# Patient Record
Sex: FEMALE | Race: WHITE | NOT HISPANIC OR LATINO | Employment: OTHER | URBAN - METROPOLITAN AREA
[De-identification: names, ages, dates, MRNs, and addresses within clinical notes are randomized per-mention and may not be internally consistent; named-entity substitution may affect disease eponyms.]

---

## 2021-02-02 ENCOUNTER — HOSPITAL ENCOUNTER (OUTPATIENT)
Facility: MEDICAL CENTER | Age: 66
End: 2021-02-03
Attending: STUDENT IN AN ORGANIZED HEALTH CARE EDUCATION/TRAINING PROGRAM | Admitting: INTERNAL MEDICINE
Payer: MEDICARE

## 2021-02-02 ENCOUNTER — APPOINTMENT (OUTPATIENT)
Dept: RADIOLOGY | Facility: MEDICAL CENTER | Age: 66
End: 2021-02-02
Payer: MEDICARE

## 2021-02-02 PROBLEM — R07.9 CHEST PAIN: Status: ACTIVE | Noted: 2021-02-02

## 2021-02-02 LAB
EKG IMPRESSION: NORMAL
EKG IMPRESSION: NORMAL
TROPONIN T SERPL-MCNC: 7 NG/L (ref 6–19)
TROPONIN T SERPL-MCNC: 7 NG/L (ref 6–19)

## 2021-02-02 PROCEDURE — 99220 PR INITIAL OBSERVATION CARE,LEVL III: CPT | Performed by: INTERNAL MEDICINE

## 2021-02-02 PROCEDURE — 36415 COLL VENOUS BLD VENIPUNCTURE: CPT

## 2021-02-02 PROCEDURE — 93010 ELECTROCARDIOGRAM REPORT: CPT | Performed by: INTERNAL MEDICINE

## 2021-02-02 PROCEDURE — 93005 ELECTROCARDIOGRAM TRACING: CPT | Performed by: INTERNAL MEDICINE

## 2021-02-02 PROCEDURE — 84484 ASSAY OF TROPONIN QUANT: CPT

## 2021-02-02 PROCEDURE — G0378 HOSPITAL OBSERVATION PER HR: HCPCS

## 2021-02-02 RX ORDER — POLYETHYLENE GLYCOL 3350 17 G/17G
1 POWDER, FOR SOLUTION ORAL
Status: DISCONTINUED | OUTPATIENT
Start: 2021-02-02 | End: 2021-02-03 | Stop reason: HOSPADM

## 2021-02-02 RX ORDER — AMOXICILLIN 250 MG
2 CAPSULE ORAL 2 TIMES DAILY
Status: DISCONTINUED | OUTPATIENT
Start: 2021-02-02 | End: 2021-02-03 | Stop reason: HOSPADM

## 2021-02-02 RX ORDER — ASPIRIN 81 MG/1
324 TABLET, CHEWABLE ORAL DAILY
Status: DISCONTINUED | OUTPATIENT
Start: 2021-02-02 | End: 2021-02-03 | Stop reason: HOSPADM

## 2021-02-02 RX ORDER — LOSARTAN POTASSIUM 50 MG/1
100 TABLET ORAL DAILY
COMMUNITY

## 2021-02-02 RX ORDER — ASPIRIN 300 MG/1
300 SUPPOSITORY RECTAL DAILY
Status: DISCONTINUED | OUTPATIENT
Start: 2021-02-02 | End: 2021-02-03 | Stop reason: HOSPADM

## 2021-02-02 RX ORDER — BISACODYL 10 MG
10 SUPPOSITORY, RECTAL RECTAL
Status: DISCONTINUED | OUTPATIENT
Start: 2021-02-02 | End: 2021-02-03 | Stop reason: HOSPADM

## 2021-02-02 RX ORDER — ASPIRIN 325 MG
325 TABLET ORAL DAILY
Status: DISCONTINUED | OUTPATIENT
Start: 2021-02-02 | End: 2021-02-03 | Stop reason: HOSPADM

## 2021-02-02 ASSESSMENT — COGNITIVE AND FUNCTIONAL STATUS - GENERAL
DAILY ACTIVITIY SCORE: 24
MOBILITY SCORE: 24
SUGGESTED CMS G CODE MODIFIER MOBILITY: CH
SUGGESTED CMS G CODE MODIFIER DAILY ACTIVITY: CH

## 2021-02-02 ASSESSMENT — PATIENT HEALTH QUESTIONNAIRE - PHQ9
1. LITTLE INTEREST OR PLEASURE IN DOING THINGS: NOT AT ALL
SUM OF ALL RESPONSES TO PHQ9 QUESTIONS 1 AND 2: 0
2. FEELING DOWN, DEPRESSED, IRRITABLE, OR HOPELESS: NOT AT ALL

## 2021-02-02 ASSESSMENT — ENCOUNTER SYMPTOMS
SEIZURES: 0
WEIGHT LOSS: 0
EYE PAIN: 0
COUGH: 0
PALPITATIONS: 0
EYE REDNESS: 0
DIARRHEA: 0
STRIDOR: 0
HEARTBURN: 0
DEPRESSION: 0
NAUSEA: 0
MYALGIAS: 0
FOCAL WEAKNESS: 0
NERVOUS/ANXIOUS: 0
HEADACHES: 0
INSOMNIA: 0
ABDOMINAL PAIN: 0
VOMITING: 0
EYE DISCHARGE: 0
SHORTNESS OF BREATH: 0
BLURRED VISION: 0
DIZZINESS: 0
NECK PAIN: 0
BACK PAIN: 0
FEVER: 0
SPUTUM PRODUCTION: 0
CHILLS: 0
ORTHOPNEA: 0

## 2021-02-02 ASSESSMENT — LIFESTYLE VARIABLES
EVER HAD A DRINK FIRST THING IN THE MORNING TO STEADY YOUR NERVES TO GET RID OF A HANGOVER: NO
DOES PATIENT WANT TO STOP DRINKING: NO
TOTAL SCORE: 0
HAVE PEOPLE ANNOYED YOU BY CRITICIZING YOUR DRINKING: NO
AVERAGE NUMBER OF DAYS PER WEEK YOU HAVE A DRINK CONTAINING ALCOHOL: 3
HAVE YOU EVER FELT YOU SHOULD CUT DOWN ON YOUR DRINKING: NO
ON A TYPICAL DAY WHEN YOU DRINK ALCOHOL HOW MANY DRINKS DO YOU HAVE: 1
EVER FELT BAD OR GUILTY ABOUT YOUR DRINKING: NO
TOTAL SCORE: 0
HOW MANY TIMES IN THE PAST YEAR HAVE YOU HAD 5 OR MORE DRINKS IN A DAY: 0
CONSUMPTION TOTAL: NEGATIVE
ALCOHOL_USE: YES
TOTAL SCORE: 0

## 2021-02-02 ASSESSMENT — PAIN DESCRIPTION - PAIN TYPE: TYPE: ACUTE PAIN

## 2021-02-02 NOTE — PROGRESS NOTES
[facility] TRIAGE OFFICER DIRECT ADMISSION REPORT  [place] [facility]  [place]ring physician: Dr Pierce  Transferring facility/physician contact number:   Chief complaint: chest pain  Pertinent history & patient course: 65 y F with chest pain, troponin negative x2, EKG with ST changes in lead V3, d dimer negative, HEART score 5  Pertinent imaging & lab results: as above  Code Status: FULL per transferring provider, I personally verified with the transferring provider patient's code status and the transferring provider has confirmed this with the patient.  Further work up or recommendations per triage officer prior to transfer: none  Consultants called prior to transfer and pertinent input from consultants: none/cardiology  Patient accepted for transfer: Yes  Consultants to be called upon arrival: none/cardiology  Admission status: Observation.   Floor requested: telemetry  If ICU transfer, name of intensivist case discussed with and pertinent input from critical care: n/a    Please inform the triage officer upon arrival of the patient to [facility] for assignment of a hospitalist to perform admission.     For any question or concerns regarding the care of this patient, please reach out to the assigned hospitalist.

## 2021-02-02 NOTE — H&P
Hospital Medicine History & Physical Note    Date of Service  2/2/2021    Primary Care Physician  No primary care provider on file.    Consultants  cardiology    Code Status  Full Code    Chief Complaint  Chest pain    History of Presenting Illness  65 y.o. female who presented 2/2/2021 with chest pain.  Patient initially presented to outlying facility for chest pain and initial work-up over the was negative.  Troponin was negative as well as D-dimer.  Patient was transferred here for cardiology evaluation.  According to the patient the chest pain started 24 hours ago around 5 6 PM yesterday afternoon.  She said that the pain is in back on her chest, throbbing pain, 10 out of 10 intensity, constant in nature.  Never had similar symptoms before.  Around 2 AM this morning she could not take it anymore so she went to outside facility for checkup.  And then she was then transferred here for further evaluation including stress test.  I reviewed chart from outside facility    Sodium 140, potassium 4.2, chloride 106, glucose 92, BUN 28, creatinine 0.9, troponin negative.  WBC 8.8, hemoglobin 14.3, platelet 355  CXR: negative  Dimer: normal    Review of Systems  Review of Systems   Constitutional: Negative for chills, fever and weight loss.   HENT: Negative for congestion and nosebleeds.    Eyes: Negative for blurred vision, pain, discharge and redness.   Respiratory: Negative for cough, sputum production, shortness of breath and stridor.    Cardiovascular: Positive for chest pain. Negative for palpitations and orthopnea.   Gastrointestinal: Negative for abdominal pain, diarrhea, heartburn, nausea and vomiting.   Genitourinary: Negative for dysuria, frequency and urgency.   Musculoskeletal: Negative for back pain, myalgias and neck pain.   Skin: Negative for itching and rash.   Neurological: Negative for dizziness, focal weakness, seizures and headaches.   Psychiatric/Behavioral: Negative for depression. The patient is not  nervous/anxious and does not have insomnia.        Past Medical History  Reviewed and no pertinent past medical history    Surgical History  Reviewed and no pertinent past surgical history    Family History  Significant family history of heart disease in her parents and sibling    Social History   Denies smoking, alcohol drinking or illicit drug use    Allergies  Not on File    Medications  None   Reviewed    Physical Exam  Temp:  [37.5 °C (99.5 °F)] 37.5 °C (99.5 °F)  Pulse:  [71] 71  Resp:  [18] 18  BP: (136)/(84) 136/84  SpO2:  [96 %] 96 %    Physical Exam  Vitals signs reviewed.   Constitutional:       General: She is not in acute distress.     Appearance: Normal appearance.   HENT:      Head: Normocephalic and atraumatic.      Nose: No congestion or rhinorrhea.   Eyes:      Extraocular Movements: Extraocular movements intact.      Pupils: Pupils are equal, round, and reactive to light.   Neck:      Musculoskeletal: Normal range of motion and neck supple.   Cardiovascular:      Rate and Rhythm: Normal rate and regular rhythm.      Pulses: Normal pulses.   Pulmonary:      Effort: Pulmonary effort is normal. No respiratory distress.      Breath sounds: Normal breath sounds.   Abdominal:      General: Bowel sounds are normal. There is no distension.      Palpations: Abdomen is soft.      Tenderness: There is no abdominal tenderness.   Musculoskeletal:         General: No swelling or tenderness.   Skin:     General: Skin is warm.      Findings: No erythema.   Neurological:      General: No focal deficit present.      Mental Status: She is alert and oriented to person, place, and time.         Laboratory:          No results for input(s): ALTSGPT, ASTSGOT, ALKPHOSPHAT, TBILIRUBIN, DBILIRUBIN, GAMMAGT, AMYLASE, LIPASE, ALB, PREALBUMIN, GLUCOSE in the last 72 hours.      No results for input(s): NTPROBNP in the last 72 hours.      No results for input(s): TROPONINT in the last 72 hours.    Imaging:  OUTSIDE IMAGES-DX  CHEST   Final Result            Assessment/Plan:  I anticipate this patient is appropriate for observation status at this time.    Chest pain  Assessment & Plan  The patient does have significant family history of heart disease in her parents and her sibling who was diagnosed at the age of 55  Trend trop and ekg   Dimer negative from outside facility  NPO  Stress test in am  Check lipid panel

## 2021-02-02 NOTE — ASSESSMENT & PLAN NOTE
The patient does have significant family history of heart disease in her parents and her sibling who was diagnosed at the age of 55  Trend trop and ekg   Dimer negative from outside facility  NPO  Stress test in am  Check lipid panel

## 2021-02-03 ENCOUNTER — APPOINTMENT (OUTPATIENT)
Dept: RADIOLOGY | Facility: MEDICAL CENTER | Age: 66
End: 2021-02-03
Attending: INTERNAL MEDICINE
Payer: MEDICARE

## 2021-02-03 VITALS
HEART RATE: 82 BPM | WEIGHT: 172.4 LBS | TEMPERATURE: 97.4 F | OXYGEN SATURATION: 95 % | SYSTOLIC BLOOD PRESSURE: 138 MMHG | BODY MASS INDEX: 32.55 KG/M2 | HEIGHT: 61 IN | DIASTOLIC BLOOD PRESSURE: 65 MMHG | RESPIRATION RATE: 16 BRPM

## 2021-02-03 PROBLEM — M25.512 SUBSCAPULAR PAIN, LEFT: Status: ACTIVE | Noted: 2021-02-03

## 2021-02-03 PROBLEM — R07.89 ATYPICAL CHEST PAIN: Status: ACTIVE | Noted: 2021-02-02

## 2021-02-03 LAB
ALBUMIN SERPL BCP-MCNC: 3.6 G/DL (ref 3.2–4.9)
ALBUMIN/GLOB SERPL: 1.5 G/DL
ALP SERPL-CCNC: 67 U/L (ref 30–99)
ALT SERPL-CCNC: 21 U/L (ref 2–50)
ANION GAP SERPL CALC-SCNC: 11 MMOL/L (ref 7–16)
AST SERPL-CCNC: 18 U/L (ref 12–45)
BILIRUB SERPL-MCNC: 0.3 MG/DL (ref 0.1–1.5)
BUN SERPL-MCNC: 24 MG/DL (ref 8–22)
CALCIUM SERPL-MCNC: 9.2 MG/DL (ref 8.5–10.5)
CHLORIDE SERPL-SCNC: 105 MMOL/L (ref 96–112)
CO2 SERPL-SCNC: 21 MMOL/L (ref 20–33)
CREAT SERPL-MCNC: 0.76 MG/DL (ref 0.5–1.4)
ERYTHROCYTE [DISTWIDTH] IN BLOOD BY AUTOMATED COUNT: 43.9 FL (ref 35.9–50)
GLOBULIN SER CALC-MCNC: 2.4 G/DL (ref 1.9–3.5)
GLUCOSE SERPL-MCNC: 95 MG/DL (ref 65–99)
HCT VFR BLD AUTO: 41.5 % (ref 37–47)
HGB BLD-MCNC: 13.4 G/DL (ref 12–16)
MCH RBC QN AUTO: 30.3 PG (ref 27–33)
MCHC RBC AUTO-ENTMCNC: 32.3 G/DL (ref 33.6–35)
MCV RBC AUTO: 93.9 FL (ref 81.4–97.8)
PLATELET # BLD AUTO: 315 K/UL (ref 164–446)
PMV BLD AUTO: 9.3 FL (ref 9–12.9)
POTASSIUM SERPL-SCNC: 4 MMOL/L (ref 3.6–5.5)
PROT SERPL-MCNC: 6 G/DL (ref 6–8.2)
RBC # BLD AUTO: 4.42 M/UL (ref 4.2–5.4)
SODIUM SERPL-SCNC: 137 MMOL/L (ref 135–145)
WBC # BLD AUTO: 6.7 K/UL (ref 4.8–10.8)

## 2021-02-03 PROCEDURE — G0378 HOSPITAL OBSERVATION PER HR: HCPCS

## 2021-02-03 PROCEDURE — 85027 COMPLETE CBC AUTOMATED: CPT

## 2021-02-03 PROCEDURE — 700102 HCHG RX REV CODE 250 W/ 637 OVERRIDE(OP): Performed by: INTERNAL MEDICINE

## 2021-02-03 PROCEDURE — 99217 PR OBSERVATION CARE DISCHARGE: CPT | Performed by: INTERNAL MEDICINE

## 2021-02-03 PROCEDURE — 80053 COMPREHEN METABOLIC PANEL: CPT

## 2021-02-03 PROCEDURE — A9270 NON-COVERED ITEM OR SERVICE: HCPCS | Performed by: INTERNAL MEDICINE

## 2021-02-03 PROCEDURE — 36415 COLL VENOUS BLD VENIPUNCTURE: CPT

## 2021-02-03 RX ADMIN — ASPIRIN 325 MG: 325 TABLET, FILM COATED ORAL at 05:14

## 2021-02-03 NOTE — PROGRESS NOTES
2 RN skin check completed with Alise KINCAID.  Devices in place N/A.  Skin assessed under devices N/A.  Confirmed pressure ulcers found on N/A.  New potential pressure ulcers noted on N/A. Wound consult placed N/A.  The following interventions in place N/A    Sacrum pink and blanching.   Elbows pink and blanching bilaterally  Feet dry and blanching bilaterally

## 2021-02-03 NOTE — DISCHARGE SUMMARY
Discharge Summary    Date of Admission: 2/2/2021  Date of Discharge: 02/03/21  Discharging Attending: ARTIE Heard*   Discharging Senior Resident: Dr. Thuan Antoine MD  Discharging Intern: Dr. Roger Orr MD    CHIEF COMPLAINT ON ADMISSION  Left Subscapular pain    Reason for Admission  Atypical chest pain, acute coronary syndrome rule out    Admission Date  2/2/2021    CODE STATUS  Full Code    HPI & HOSPITAL COURSE  Britni Gonsalez is a 66yo F with hx of HTN, colon cancer, transferred 2/2/21 from [facility] for unstable angina. On admission, she clarifies that pain is subscapular on the left side, present for approximately 18-24 hours, without radiation, diaphoresis, or shortness of breath. Pain was 10/10 at worst, and resolved after administration of ativan and rest.  While admitted at [facility], repeat EKG did not show acute waveform changes/abnormalities. Repeat troponins were 7 and 7 respectively. Stress test was planned and scheduled, however, discussion with patient and due to recent breakfast and caffeine ingestion, was unable to successfully pursue testing. Discussed patient preference on future testing, it is preferred to discharge to home and follow up with PCP and cardiologist closer to home (SUMIT Mccartney).    Atypical chest pain:  transferred to [facility] 2/2 by air for unstable angina. Repeat EKG and troponins (7,7) were negative. Ms. Gonsalez clarifies that pain was L subscapular, reproducible with movement/pressure/palpation. Discussed stress test for risk stratification of coronary events; stress test was deferred to PCP/local cardiology due to limited communication regarding testing; had eating and had caffeinated coffee. After re-discussing risk stratification, she preferred to follow up with PCP and local cardiologist for future testing.  -PCP follow up for referral to cardiologist, who may consider future stress test    Subscapular musculoskeletal pain:  Sudden onset  sharp left subscapular pain, which was worsened with movement, pressure, palpation. No radiation or associated symptoms. Pain was constant for over 24 hours. Pain resolved after ativan and rest.  -PCP follow up pain regimen    Therefore, she is discharged in good and stable condition to home with close outpatient follow-up.    The patient recovered much more quickly than anticipated on admission.    PHYSICAL EXAM ON DISCHARGE  Temp:  [36.1 °C (97 °F)-37.7 °C (99.8 °F)] 36.3 °C (97.4 °F)  Pulse:  [62-83] 82  Resp:  [16-18] 16  BP: (121-147)/(65-92) 138/65  SpO2:  [92 %-95 %] 95 %    Physical Exam    GEN: well appearing, no acute distress  CV: RRR, no m/g/r  Pulm: CTAB, no rales, wheeze, crackles  Abd: soft, nontender, nondistended  Extremities: shoulder/elbow flexion/extension 5/5 bilaterally, knee flexion/extension 5/5 bilaterally, no peripheral edema  Neuro: CN II-XII intact, no focal deficits, Aox4  Psych: Denies any SI/HI morning of discharge    Discharge Date  02/03/21      FOLLOW UP ITEMS POST DISCHARGE  PCP for cardiology referral for stress test, risk stratification; pain regimen for subscapular musculoskeletal pain    DISCHARGE DIAGNOSES  Active Problems:    Atypical chest pain POA: Unknown    Subscapular pain, left POA: Unknown  Resolved Problems:    * No resolved hospital problems. *      FOLLOW UP  No future appointments.  Tyler Michelle M.D.  [address] [place] [id number]  [facility] [address]  [phone]    Schedule an appointment as soon as possible for a visit in 1 week  Follow up atypical chest pain, referal to cardiology for consider treadmill stress test      MEDICATIONS ON DISCHARGE     Medication List      CONTINUE taking these medications      Instructions   losartan 50 MG Tabs  Commonly known as: COZAAR   Take 100 mg by mouth every day. For hypertension  Dose: 100 mg            Allergies  Allergies   Allergen Reactions   • Morphine      Itchy        DIET  Orders Placed This Encounter   Procedures   • Diet  Order Diet: Cardiac     Standing Status:   Standing     Number of Occurrences:   1     Order Specific Question:   Diet:     Answer:   Cardiac [6]   • Discontinue Diet Tray     Standing Status:   Standing     Number of Occurrences:   1       ACTIVITY  As tolerated.  Weight bearing as tolerated    CONSULTATIONS  none    PROCEDURES  none

## 2021-02-03 NOTE — PROGRESS NOTES
Patient discharged home independent. VSS on room air. Belongings sent with patient. Discharge instructions and education received and verbalized understanding. Assisted to private vehicle for transport home.

## 2021-02-03 NOTE — DISCHARGE INSTRUCTIONS
Discharge Instructions    Discharged to home by car with relative. Discharged via walking, hospital escort: Refused.  Special equipment needed: Not Applicable    Be sure to schedule a follow-up appointment with your primary care doctor or any specialists as instructed.     Discharge Plan:   Influenza Vaccine Indication: Not indicated: Previously immunized this influenza season and > 8 years of age    I understand that a diet low in cholesterol, fat, and sodium is recommended for good health. Unless I have been given specific instructions below for another diet, I accept this instruction as my diet prescription.   Other diet: cardiac    Special Instructions: None    · Is patient discharged on Warfarin / Coumadin?   No     Depression / Suicide Risk    As you are discharged from this [facility] facility, it is important to learn how to keep safe from harming yourself.    Recognize the warning signs:  · Abrupt changes in personality, positive or negative- including increase in energy   · Giving away possessions  · Change in eating patterns- significant weight changes-  positive or negative  · Change in sleeping patterns- unable to sleep or sleeping all the time   · Unwillingness or inability to communicate  · Depression  · Unusual sadness, discouragement and loneliness  · Talk of wanting to die  · Neglect of personal appearance   · Rebelliousness- reckless behavior  · Withdrawal from people/activities they love  · Confusion- inability to concentrate     If you or a loved one observes any of these behaviors or has concerns about self-harm, here's what you can do:  · Talk about it- your feelings and reasons for harming yourself  · Remove any means that you might use to hurt yourself (examples: pills, rope, extension cords, firearm)  · Get professional help from the community (Mental Health, Substance Abuse, psychological counseling)  · Do not be alone:Call your Safe Contact- someone whom you trust who will be there for  you.  · Call your local CRISIS HOTLINE 443-8962 or 717-158-2451  · Call your local Children's Mobile Crisis Response Team [place] (272) 362-8608 or www.CONWEAVER  · Call the toll free National Suicide Prevention Hotlines   · National Suicide Prevention Lifeline 277-264-XIME (0032)  · National OneTwoSee Line Network 800-SUICIDE (931-4463)    Chest Pain Observation  It is often hard to give a specific diagnosis for the cause of chest pain. Among other possibilities your symptoms might be caused by inadequate oxygen delivery to your heart (angina). Angina that is not treated or evaluated can lead to a heart attack (myocardial infarction) or death.  Blood tests, electrocardiograms, and X-rays may have been done to help determine a possible cause of your chest pain. After evaluation and observation, your health care provider has determined that it is unlikely your pain was caused by an unstable condition that requires hospitalization. However, a full evaluation of your pain may need to be completed, with additional diagnostic testing as directed. It is very important to keep your follow-up appointments. Not keeping your follow-up appointments could result in permanent heart damage, disability, or death. If there is any problem keeping your follow-up appointments, you must call your health care provider.  HOME CARE INSTRUCTIONS   Due to the slight chance that your pain could be angina, it is important to follow your health care provider's treatment plan and also maintain a healthy lifestyle:  · Maintain or work toward achieving a healthy weight.  · Stay physically active and exercise regularly.  · Decrease your salt intake.  · Eat a balanced, healthy diet. Talk to a dietitian to learn about heart-healthy foods.  · Increase your fiber intake by including whole grains, vegetables, fruits, and nuts in your diet.  · Avoid situations that cause stress, anger, or depression.  · Take medicines as advised by your health  care provider. Report any side effects to your health care provider. Do not stop medicines or adjust the dosages on your own.  · Quit smoking. Do not use nicotine patches or gum until you check with your health care provider.  · Keep your blood pressure, blood sugar, and cholesterol levels within normal limits.  · Limit alcohol intake to no more than 1 drink per day for women who are not pregnant and 2 drinks per day for men.  · Do not abuse drugs.  SEEK IMMEDIATE MEDICAL CARE IF:  You have severe chest pain or pressure which may include symptoms such as:  · You feel pain or pressure in your arms, neck, jaw, or back.  · You have severe back or abdominal pain, feel sick to your stomach (nauseous), or throw up (vomit).  · You are sweating profusely.  · You are having a fast or irregular heartbeat.  · You feel short of breath while at rest.  · You notice increasing shortness of breath during rest, sleep, or with activity.  · You have chest pain that does not get better after rest or after taking your usual medicine.  · You wake from sleep with chest pain.  · You are unable to sleep because you cannot breathe.  · You develop a frequent cough or you are coughing up blood.  · You feel dizzy, faint, or experience extreme fatigue.  · You develop severe weakness, dizziness, fainting, or chills.  Any of these symptoms may represent a serious problem that is an emergency. Do not wait to see if the symptoms will go away. Call your local emergency services (911 in the [place] Do not drive yourself to the hospital.  MAKE SURE YOU:  · Understand these instructions.  · Will watch your condition.  · Will get help right away if you are not doing well or get worse.     This information is not intended to replace advice given to you by your health care provider. Make sure you discuss any questions you have with your health care provider.     Document Released: 01/20/2012 Document Revised: 12/23/2014 Document Reviewed:  06/19/2014  Elsevier Interactive Patient Education ©2016 [employer]

## 2021-02-03 NOTE — CARE PLAN
Problem: Communication  Goal: The ability to communicate needs accurately and effectively will improve  Outcome: PROGRESSING AS EXPECTED  Intervention: [place] patient and significant other/support system to call light to alert staff of needs  Note: Pt educated on POC and medications. Pt verbalized understanding.      Problem: Safety  Goal: Will remain free from injury  Outcome: PROGRESSING AS EXPECTED  Intervention: Provide assistance with mobility  Note: Pt bedside table and call-light are within reach, bed in lowest position and locked. Treaded socks on and pt has been educated on call light use and asked to call before getting up.